# Patient Record
Sex: MALE
[De-identification: names, ages, dates, MRNs, and addresses within clinical notes are randomized per-mention and may not be internally consistent; named-entity substitution may affect disease eponyms.]

---

## 2019-12-10 DIAGNOSIS — Z84.89 FAMILY HISTORY OF GENETIC DISEASE: Primary | ICD-10-CM

## 2019-12-10 DIAGNOSIS — Z84.89 FAMILY HISTORY OF GENETIC DISEASE: ICD-10-CM

## 2019-12-10 LAB — MISCELLANEOUS TEST: NORMAL

## 2019-12-10 PROCEDURE — 36415 COLL VENOUS BLD VENIPUNCTURE: CPT | Performed by: MEDICAL GENETICS

## 2019-12-12 NOTE — PROGRESS NOTES
Name: Dev Soler   : 1970  MRN: [id number]  Date of Service: 2019  PCP: No Ref-Primary, Physician    Presenting information:   Dev is a 49 year old male who has agreed to provide a sample for whole exome sequencing to aid in interpretation of his child's sample, Tee.  I discussed the testing with Dev in person at his child's appointment. We discussed Dev's personal and family history and obtained informed consent for genetic testing.    Medical History:   Dev does not have any similar symptoms to his child. he has normal vision and hearing, muscle tone, feeding, growth, and development. He is socially appropriate. He has not been hospitalized for major illnesses or had surgery.      No past medical history on file.     Family History: A three generation pedigree was obtained at Dev's child's visit and scanned into the EMR.        Discussion: Dev consented to provide samples to aid in interpretation of the proband's whole exome sequencing. This test can identify familial relationships. The potential results were discussed including a positive, negative, or variant of uncertain significance. Parental samples would be used to determine how it was inherited, if at all. If the change is believed to be pathogenic, it can alter clinical management for Dev. It can also provide recurrence risk information. Testing other family members or pregnancies can be considered.     Both parents understood the purpose of the testing.     [facility] Secondary Findings  We reviewed that the lab can report the results of gene mutations that are found in genes recommended by the [employer] ([facility]) to be reported to VICKIE patients even if the gene variant does not contribute to their current symptoms.  Many of these gene changes may not be associated with symptoms until adulthood and are not traditionally tested for in children, but may lead to medical management changes.  "Examples include genes related to increased cancer risk and heart arrhythmias. In addition, parental carrier status for a change in one of the secondary findings gene may sought from Dev's results.      We discussed that there are insurance implications related to these findings in terms of life, short term disability, and long term disability insurance. There is a federal law in place at the moment, The Genetic Information Nondiscrimination Act or JOVAN (2008) that protects again health insurance discrimination.  Health insurance protections do not apply to members of the [place]  who receive care through [employer], Veterans receiving care through the [facility], the [facility] Service, or federal employees who receive care through Federal Employees Health Benefits Plan.    Employers may not discriminate (hiring, firing, promotions etc.), based on genetic information. This only applies to companies with 15 or more employees. It does not apply to federal employees, or , which have their own nondiscrimination protections in place. Employers may have \"voluntary\" health services such as employee wellness programs that request genetic information or family history, which is not a violation of JOVAN.    They would like [facility] receive [facility] findings.     Insurance Coverage for VICKIE  We reviewed the potential costs of VICKIE and discussed that the lab will look into the costs of testing through the family's insurance on their behalf.  We reviewed that they will be contacted by the laboratory with the expected out-of-pocket cost, but they should also check this information with their insurance company. This estimation is not guaranteed. If the out-of-pocket cost of testing is <$100, the family will not be contacted and testing will be initiated. The family has the right to decline to proceed with testing based on the out-of-pocket cost. If the estimate is to high for the family, [employer] offers financial assistance based " on house-hold income and household size. They may also switch to the patient-pay price of $2500, which can be paid over 24 months. Testing will be run through the child's insurance, however there may be a charge to [facility] insurance for the blood draw.     The family provided written informed consent for the testing. They will not be receiving individual reports from this test. All samples must be received within three weeks of each other. We will plan to follow-up with the family by phone when results are returned. A follow-up appointment in the Genetics department for further discussion will be scheduled alongside Dev's child's visits. Additional questions or concerns were denied.       Plan:  1. Blood was drawn after today's appointment and send to [facility].  2. Results will be returned by phone and we will schedule a follow-up appointment at that time if needed. A separate results report will not be issued for Dev.  3. Contact information was provided should any questions arise in the future.       Myriam Olivo [facility]  Genetic Counselor  [facility]   Phone: [phone]  Pager: [phone]  Email: [email or web address]       Approximate Time Spent in Consultation: 10 min     CC: No letter

## 2020-02-24 ENCOUNTER — HEALTH MAINTENANCE LETTER (OUTPATIENT)
Age: 50
End: 2020-02-24

## 2020-04-30 LAB
LOCATION PERFORMED: NORMAL
RESULT: NORMAL
SEND OUTS MISC TEST CODE: NORMAL
SEND OUTS MISC TEST SPECIMEN: NORMAL
TEST NAME: NORMAL

## 2020-12-13 ENCOUNTER — HEALTH MAINTENANCE LETTER (OUTPATIENT)
Age: 50
End: 2020-12-13

## 2021-04-17 ENCOUNTER — HEALTH MAINTENANCE LETTER (OUTPATIENT)
Age: 51
End: 2021-04-17

## 2021-09-26 ENCOUNTER — HEALTH MAINTENANCE LETTER (OUTPATIENT)
Age: 51
End: 2021-09-26

## 2022-05-08 ENCOUNTER — HEALTH MAINTENANCE LETTER (OUTPATIENT)
Age: 52
End: 2022-05-08

## 2023-04-23 ENCOUNTER — HEALTH MAINTENANCE LETTER (OUTPATIENT)
Age: 53
End: 2023-04-23

## 2023-06-02 ENCOUNTER — HEALTH MAINTENANCE LETTER (OUTPATIENT)
Age: 53
End: 2023-06-02